# Patient Record
Sex: FEMALE | Race: WHITE | NOT HISPANIC OR LATINO | Employment: FULL TIME | URBAN - METROPOLITAN AREA
[De-identification: names, ages, dates, MRNs, and addresses within clinical notes are randomized per-mention and may not be internally consistent; named-entity substitution may affect disease eponyms.]

---

## 2017-04-18 ENCOUNTER — OFFICE VISIT (OUTPATIENT)
Dept: FAMILY MEDICINE CLINIC | Facility: CLINIC | Age: 57
End: 2017-04-18

## 2017-04-18 VITALS
WEIGHT: 151.2 LBS | HEIGHT: 65 IN | BODY MASS INDEX: 25.19 KG/M2 | DIASTOLIC BLOOD PRESSURE: 64 MMHG | HEART RATE: 77 BPM | TEMPERATURE: 98.7 F | SYSTOLIC BLOOD PRESSURE: 106 MMHG | OXYGEN SATURATION: 97 %

## 2017-04-18 DIAGNOSIS — Z00.00 ANNUAL PHYSICAL EXAM: Primary | ICD-10-CM

## 2017-04-18 DIAGNOSIS — Z63.5 MARITAL DISRUPTION INVOLVING DIVORCE: ICD-10-CM

## 2017-04-18 DIAGNOSIS — Z12.11 COLON CANCER SCREENING: ICD-10-CM

## 2017-04-18 PROBLEM — M77.10 BACKHAND TENNIS ELBOW: Status: ACTIVE | Noted: 2017-04-18

## 2017-04-18 PROBLEM — M77.00 EPICONDYLITIS ELBOW, MEDIAL: Status: ACTIVE | Noted: 2017-04-18

## 2017-04-18 PROBLEM — M54.12 CERVICAL NERVE ROOT DISORDER: Status: ACTIVE | Noted: 2017-04-18

## 2017-04-18 PROBLEM — F41.9 ANXIETY: Status: ACTIVE | Noted: 2017-04-18

## 2017-04-18 PROBLEM — G56.00 CARPAL TUNNEL SYNDROME: Status: ACTIVE | Noted: 2017-04-18

## 2017-04-18 PROCEDURE — 99396 PREV VISIT EST AGE 40-64: CPT | Performed by: FAMILY MEDICINE

## 2017-04-18 RX ORDER — IBUPROFEN 200 MG
TABLET ORAL
COMMUNITY
Start: 2014-03-20

## 2017-04-18 RX ORDER — ALPRAZOLAM 0.5 MG/1
TABLET ORAL
COMMUNITY
Start: 2013-06-20

## 2017-04-18 SDOH — SOCIAL STABILITY - SOCIAL INSECURITY: DISRUPTION OF FAMILY BY SEPARATION AND DIVORCE: Z63.5

## 2017-04-18 NOTE — PROGRESS NOTES
"Subjective   Rosa Sandoval is a 56 y.o. female.     History of Present Illness Here for a complete physical.  Going through divorce-  Undergoing mediation and court next May.   Pt wanted him t go , but it is still hard for her. He was a player. \"Shaneka had enough\".  No children with him.Pt has 2 children, 23 and 21.they live with their father.  [employer], .    Thinks she is getting depressed but does not want any antidepressants at this point. I s willing to consider therapy.Use   The following portions of the patient's history were reviewed and updated as appropriate: allergies, current medications, past social history and problem list.    Review of Systems   Constitutional: Negative for appetite change, fever and unexpected weight change.   HENT: Negative for ear pain, facial swelling and sore throat.    Eyes: Negative for pain and visual disturbance.   Respiratory: Negative for chest tightness, shortness of breath and wheezing.    Cardiovascular: Negative for chest pain and palpitations.   Gastrointestinal: Negative for abdominal pain and blood in stool.   Endocrine: Negative.    Genitourinary: Negative for difficulty urinating and hematuria.   Musculoskeletal: Negative for joint swelling.   Neurological: Negative for tremors, seizures and syncope.   Hematological: Negative for adenopathy.   Psychiatric/Behavioral: Positive for dysphoric mood. The patient is nervous/anxious.        Objective   /64 (BP Location: Left arm, Patient Position: Sitting, Cuff Size: Adult)  Pulse 77  Temp 98.7 °F (37.1 °C) (Oral)   Ht 65\" (165.1 cm)  Wt 151 lb 3.2 oz (68.6 kg)  SpO2 97%  BMI 25.16 kg/m2  Physical Exam   Constitutional: She is oriented to person, place, and time. She appears well-developed and well-nourished. No distress.   HENT:   Head: Normocephalic and atraumatic. Hair is normal.   Right Ear: Hearing, tympanic membrane, external ear and ear canal normal. No drainage. No decreased hearing " is noted.   Left Ear: Hearing, tympanic membrane, external ear and ear canal normal. No decreased hearing is noted.   Nose: No nasal deformity.   Mouth/Throat: Oropharynx is clear and moist.   Eyes: Conjunctivae, EOM and lids are normal. Pupils are equal, round, and reactive to light. Lids are everted and swept, no foreign bodies found. Right eye exhibits no discharge. Left eye exhibits no discharge.   Fundoscopic exam:       The right eye shows red reflex.        The left eye shows red reflex.   Neck: Normal range of motion. Neck supple. No JVD present. No tracheal deviation present. No thyromegaly present.   Cardiovascular: Normal rate, regular rhythm, normal heart sounds, intact distal pulses and normal pulses.  Exam reveals no gallop and no friction rub.    No murmur heard.  Pulmonary/Chest: Effort normal and breath sounds normal. No respiratory distress. She has no wheezes. She has no rales. She exhibits no tenderness. Right breast exhibits no mass and no tenderness. Left breast exhibits no mass and no tenderness. Breasts are symmetrical.   Abdominal: Soft. Bowel sounds are normal. She exhibits no distension and no mass. There is no tenderness. There is no rebound and no guarding. No hernia.   Musculoskeletal: Normal range of motion. She exhibits no edema, tenderness or deformity.   Lymphadenopathy:     She has no cervical adenopathy.        Right: No inguinal adenopathy present.        Left: No inguinal adenopathy present.   Neurological: She is alert and oriented to person, place, and time. She has normal reflexes. She displays normal reflexes. No cranial nerve deficit. She exhibits normal muscle tone. Coordination normal.   Skin: Skin is warm and dry. No rash noted. She is not diaphoretic. No erythema.   Psychiatric: She has a normal mood and affect. Her behavior is normal. Judgment and thought content normal.   Nursing note and vitals reviewed.      Assessment/Plan   Problem List Items Addressed This Visit      None      Visit Diagnoses     Annual physical exam    -  Primary    Relevant Orders    CBC & Differential (Completed)    Comprehensive Metabolic Panel (Completed)    Lipid Panel With / Chol / HDL Ratio (Completed)    TSH (Completed)    Vitamin D 25 Hydroxy (Completed)    Colon cancer screening        Relevant Orders    Ambulatory Referral For Screening Colonoscopy    Marital disruption involving divorce               Use insurance to locate therapist.   Can call for antidpressant if nec, and I would of course see her again at that point.

## 2017-04-19 LAB
25(OH)D3+25(OH)D2 SERPL-MCNC: 42 NG/ML (ref 30–100)
ALBUMIN SERPL-MCNC: 4.3 G/DL (ref 3.5–5.2)
ALBUMIN/GLOB SERPL: 1.5 G/DL
ALP SERPL-CCNC: 57 U/L (ref 39–117)
ALT SERPL-CCNC: 18 U/L (ref 1–33)
AST SERPL-CCNC: 21 U/L (ref 1–32)
BASOPHILS # BLD AUTO: 0.08 10*3/MM3 (ref 0–0.2)
BASOPHILS NFR BLD AUTO: 1.2 % (ref 0–1.5)
BILIRUB SERPL-MCNC: 0.3 MG/DL (ref 0.1–1.2)
BUN SERPL-MCNC: 12 MG/DL (ref 6–20)
BUN/CREAT SERPL: 13.2 (ref 7–25)
CALCIUM SERPL-MCNC: 10.2 MG/DL (ref 8.6–10.5)
CHLORIDE SERPL-SCNC: 103 MMOL/L (ref 98–107)
CHOLEST SERPL-MCNC: 215 MG/DL (ref 0–200)
CHOLEST/HDLC SERPL: 3.64 {RATIO}
CO2 SERPL-SCNC: 27 MMOL/L (ref 22–29)
CREAT SERPL-MCNC: 0.91 MG/DL (ref 0.57–1)
EOSINOPHIL # BLD AUTO: 0.06 10*3/MM3 (ref 0–0.7)
EOSINOPHIL NFR BLD AUTO: 0.9 % (ref 0.3–6.2)
ERYTHROCYTE [DISTWIDTH] IN BLOOD BY AUTOMATED COUNT: 13.3 % (ref 11.7–13)
GLOBULIN SER CALC-MCNC: 2.9 GM/DL
GLUCOSE SERPL-MCNC: 87 MG/DL (ref 65–99)
HCT VFR BLD AUTO: 39.8 % (ref 35.6–45.5)
HDLC SERPL-MCNC: 59 MG/DL (ref 40–60)
HGB BLD-MCNC: 13.2 G/DL (ref 11.9–15.5)
IMM GRANULOCYTES # BLD: 0 10*3/MM3 (ref 0–0.03)
IMM GRANULOCYTES NFR BLD: 0 % (ref 0–0.5)
LDLC SERPL CALC-MCNC: 135 MG/DL (ref 0–100)
LYMPHOCYTES # BLD AUTO: 2.02 10*3/MM3 (ref 0.9–4.8)
LYMPHOCYTES NFR BLD AUTO: 29.3 % (ref 19.6–45.3)
MCH RBC QN AUTO: 30.9 PG (ref 26.9–32)
MCHC RBC AUTO-ENTMCNC: 33.2 G/DL (ref 32.4–36.3)
MCV RBC AUTO: 93.2 FL (ref 80.5–98.2)
MONOCYTES # BLD AUTO: 0.55 10*3/MM3 (ref 0.2–1.2)
MONOCYTES NFR BLD AUTO: 8 % (ref 5–12)
NEUTROPHILS # BLD AUTO: 4.18 10*3/MM3 (ref 1.9–8.1)
NEUTROPHILS NFR BLD AUTO: 60.6 % (ref 42.7–76)
PLATELET # BLD AUTO: 293 10*3/MM3 (ref 140–500)
POTASSIUM SERPL-SCNC: 4.8 MMOL/L (ref 3.5–5.2)
PROT SERPL-MCNC: 7.2 G/DL (ref 6–8.5)
RBC # BLD AUTO: 4.27 10*6/MM3 (ref 3.9–5.2)
SODIUM SERPL-SCNC: 143 MMOL/L (ref 136–145)
TRIGL SERPL-MCNC: 104 MG/DL (ref 0–150)
TSH SERPL DL<=0.005 MIU/L-ACNC: 1.71 MIU/ML (ref 0.27–4.2)
VLDLC SERPL CALC-MCNC: 20.8 MG/DL (ref 5–40)
WBC # BLD AUTO: 6.89 10*3/MM3 (ref 4.5–10.7)

## 2019-03-20 ENCOUNTER — APPOINTMENT (OUTPATIENT)
Dept: WOMENS IMAGING | Facility: HOSPITAL | Age: 59
End: 2019-03-20

## 2019-03-20 PROCEDURE — 77067 SCR MAMMO BI INCL CAD: CPT | Performed by: RADIOLOGY

## 2019-03-20 PROCEDURE — 77063 BREAST TOMOSYNTHESIS BI: CPT | Performed by: RADIOLOGY

## 2022-03-02 ENCOUNTER — TREATMENT (OUTPATIENT)
Dept: PHYSICAL THERAPY | Facility: CLINIC | Age: 62
End: 2022-03-02

## 2022-03-02 DIAGNOSIS — M54.2 CERVICALGIA: Primary | ICD-10-CM

## 2022-03-02 PROCEDURE — 97530 THERAPEUTIC ACTIVITIES: CPT | Performed by: PHYSICAL THERAPIST

## 2022-03-02 PROCEDURE — 97140 MANUAL THERAPY 1/> REGIONS: CPT | Performed by: PHYSICAL THERAPIST

## 2022-03-02 PROCEDURE — 97162 PT EVAL MOD COMPLEX 30 MIN: CPT | Performed by: PHYSICAL THERAPIST

## 2022-03-02 PROCEDURE — 97012 MECHANICAL TRACTION THERAPY: CPT | Performed by: PHYSICAL THERAPIST

## 2022-03-02 PROCEDURE — 97014 ELECTRIC STIMULATION THERAPY: CPT | Performed by: PHYSICAL THERAPIST

## 2022-03-02 NOTE — PROGRESS NOTES
Physical Therapy Initial Evaluation and Plan of Care    Patient Name: Rosa Baltazar          :  1960  Referring Physician: Judy Alexis APRN  Diagnosis: Cervicalgia [M54.2]    Date of Evaluation: 3/2/2022  ______________________________________________________________________    Subjective Evaluation    History of Present Illness  Onset date: .  Mechanism of injury: Woke up in am severe pain -   Moved boxes the day before-  2017 similar symptoms on (L) side - from holding phone -     Been seeing Chiro, massages since  3x/wk, mm relaxers  Some better      Patient Occupation:  - computer and phone Pain  Current pain ratin  At worst pain ratin  Location: (L) neck, UT, Scap and down LUE (medially into dorsally into dorsal hand and LLF) - Numbness only in LLF - pain in UE and neck ; Denies weakness in LUE -   Quality: throbbing -   Alleviating factors: Left arm OH -   Exacerbated by: C-rotations; Looking down for prolonged periods - Lifting / carrying; Sitting w/ arm on arm rest -   Symptom course: Improving overall, but recent flare -     Hand dominance: right    Diagnostic Tests  Abnormal x-ray: at Chri[facility]ractor - C5-6?    Treatments  Current treatment: chiropractic and massage  Patient Goals  Patient/family treatment goals: Pain alleviation; Mobility, strength to allow ADLs, job and hobby activities -          ___________________________________________________  Objective          Postural Observations    Additional Postural Observation Details  Decreased C-lordosis; Resting tension (B) UTs  Hypertrophy supraclavicular region (L)  Fwd head / rounded shoulder posture -     Tenderness     Additional Tenderness Details  Tender (L) 1st rib, scalenes, UT >>(R)  Tender C5-7 (R) - Non-tender central C-spine and upper Tspine -     Active Range of Motion     Additional Active Range of Motion Details  C-spine:  Flexion WNL;  Ext 50-deg w/ increased pain in (L) neck  "/ UT and down LUE -   SB: 30-deg (B) w/o pain or radicular symptoms  Rot (R) 70-deg  w/o pain or radicular symptoms -   ROT (L) 50-deg w/ pain (L) lower neck / UT region -     Shoulder / UE WNL -     Strength/Myotome Testing     Additional Strength Details  UE myotomes grossly intact w/ the exception of pain and \"weakness\" w/ resisted finger 2-3 ABDuction - Mild weakness of elbow flexion > shoulder FF w/ pain in  Dorsal forearm to dorsal wrist / hand (L) -     Tests     Additional Tests Details  (-) C-compression; (+) C-Distraction w/ alleviation of LUE symptoms and neck symptoms;   Pain w/ Spurling's w/ OP (L) -  (+) Elevated 1st rib (L)         See Treatment Flow sheet for Exercises, Manual therapy, and modalities.-Set up HEP   FUNCTIONAL ACTIVITIES:   · TAPING / BRACING: NA  · Anatomy / Dysfunction education -   · Jt protection, ADL modification; Posture and     ___________________________________________________  Assessment & Plan     Assessment    Assessment details: 62 yo : Cervicalgia w/ cervical radiculopathy (L) C7 dermatome?- Possible disc involvement -   Pt noted decreased / alleviated LUE and LLF numbness w/ C-traction, etc.     PROBLEMS: Pain; Limited mobility; Intolerance to ADLs, normal job, etc.   PROGNOSIS: Fair/ Good -    GOALS:   SHORT TERM GOALS: 2 weeks:  1) HEP Initiated; 2) Pain decreased 50%:   3) AROM C-spine increased 10-deg as appropriate:  4) Improved postural positioning w/ cuing; 5) Improved functional ability overall -      LONG TERM GOALS: 4 weeks (or at time of DISCHARGE): 1) (I) HEP; 2) AROM WFL and pain free; 3) Strength / mobility to be able to perform all ADL's and job-related activities w/o restrictions; 4) Pt demonstrates improved postural awareness w/ minimal cuing;       Plan  Planned therapy interventions: flexibility, home exercise program, manual therapy, neuromuscular re-education, postural training, soft tissue mobilization, spinal/joint mobilization, " strengthening, stretching and therapeutic activities (Modalities prn; C-traction prn; )  Frequency: 2x week  Duration in weeks: 4  Treatment plan discussed with: patient      ___________________________________________________  Manual Therapy:    15     mins  79524;  Therapeutic Exercise:    05     mins  86372;     Neuromuscular Jefry:        mins  17915;    Therapeutic Activity:     10    mins  56945;   Self Care:                           mins  06040  Ultrasound:     06     mins  88761;  Iontophoresis:          mins  99764;    Electrical Stimulation:    15     mins  08441 ( );  Mechanical Traction:     15     mins  06814  Dry Needling          mins self-pay    Eval:   20   mins    Timed Treatment:   36   mins                  Total Treatment:     95   mins    PT SIGNATURE:   Scooby Suh, PT  DATE TREATMENT INITIATED: 3/2/2022  ___________________________________________________  Initial Certification  Certification Period: 5/31/2022  I certify that the therapy services are furnished while this patient is under my care.  The services outlined above are required by this patient, and will be reviewed every 90 days.     PHYSICIAN: ________________________________  DATE: ______  Judy Alexis APRN        Please sign and return via fax to [phone].. Thank you, [facility].  ______________________________________________________________________  [address] [place] [address]  Phone: [phone] Fax: [phone]

## 2022-03-08 ENCOUNTER — TREATMENT (OUTPATIENT)
Dept: PHYSICAL THERAPY | Facility: CLINIC | Age: 62
End: 2022-03-08

## 2022-03-08 DIAGNOSIS — M54.2 CERVICALGIA: Primary | ICD-10-CM

## 2022-03-08 PROCEDURE — 97140 MANUAL THERAPY 1/> REGIONS: CPT | Performed by: PHYSICAL THERAPIST

## 2022-03-08 PROCEDURE — 97014 ELECTRIC STIMULATION THERAPY: CPT | Performed by: PHYSICAL THERAPIST

## 2022-03-08 PROCEDURE — 97012 MECHANICAL TRACTION THERAPY: CPT | Performed by: PHYSICAL THERAPIST

## 2022-03-08 PROCEDURE — 97110 THERAPEUTIC EXERCISES: CPT | Performed by: PHYSICAL THERAPIST

## 2022-03-08 NOTE — PROGRESS NOTES
Physical Therapy Daily Progress Note  RE-ASSESSMENT     Patient Name: Rosa Baltazar         :  1960  Referring Physician: Judy Alexis APRN      Subjective   Rosa Baltazar reports: much less LUE pain/numbness - now mostly notes numbness only in LLE and pain at inferior aspect of her (L) scap w/ soreness in (L) side of neck and UT - Improved mobilitiy, but limited w/ (L) rotation of neck -     Objective   Tender (L) Cspine/UT - First rib - (L) - (+) elevated 1st rib (L)  Limited c-rotation to (L)  - improved after 1st rib mobilization -     See Exercise, Manual, and Modality Logs for complete treatment.     Functional / Therapeutic Activities:    · TAPING / BRACING: NA  · Jt protection, ADL modification; Posture and      Assessment/Plan  60 yo : Cervicalgia w/ cervical radiculopathy (L) C7 dermatome?- Possible disc involvement -   Pt noted decreased / alleviated LUE and LLF numbness w/ C-traction, etc.     Progress per Plan of Care       _________________________________________________    Manual Therapy:            15     mins  38429;  Therapeutic Exercise:    20    mins  88291;     Neuromuscular Jefry:        mins  62929;    Therapeutic Activity:           mins  52911;     Ultrasound:                    06     mins  71089;  Iontophoresis:                     mins  85948;    Electrical Stimulation:     15    mins  61449 ( );  Mechanical Traction:      15    mins  45088  Dry Needling                       mins self-pay     Timed Treatment:   41    mins                  Total Treatment:     75    mins        Scooby Suh PT  Physical Therapist

## 2022-03-10 ENCOUNTER — TREATMENT (OUTPATIENT)
Dept: PHYSICAL THERAPY | Facility: CLINIC | Age: 62
End: 2022-03-10

## 2022-03-10 DIAGNOSIS — M54.2 CERVICALGIA: Primary | ICD-10-CM

## 2022-03-10 PROCEDURE — 97110 THERAPEUTIC EXERCISES: CPT | Performed by: PHYSICAL THERAPIST

## 2022-03-10 PROCEDURE — 97012 MECHANICAL TRACTION THERAPY: CPT | Performed by: PHYSICAL THERAPIST

## 2022-03-10 NOTE — PROGRESS NOTES
Physical Therapy Daily Progress Note    Patient Name: Rosa Baltazar         :  1960  Referring Physician: Judy Alexis APRN    Pt about 10 min late - work -   Subjective   Rosa Baltazar reports: continued improvement -no UE symptoms other than numbness in LLF, but improving and now only proximal/volar P1 -    Objective   Improved postural awareness -     See Exercise, Manual, and Modality Logs for complete treatment.     Functional / Therapeutic Activities:    · TAPING / BRACING: NA  · Jt protection, ADL modification; Posture and       Assessment/Plan  60 yo : Cervicalgia w/ cervical radiculopathy (L) C7 dermatome?- Possible disc involvement -   Pt noted decreased / alleviated LUE and LLF numbness w/ C-traction, etc.      Progress per Plan of Care     _________________________________________________     Manual Therapy:                 mins  79151;  Therapeutic Exercise:    20    mins  76750;     Neuromuscular Jefry:        mins  01417;    Therapeutic Activity:           mins  53893;     Ultrasound:                         mins  52009;  Iontophoresis:                     mins  88907;    Electrical Stimulation:         mins  35322 ( );  Mechanical Traction:      15    mins  93072  Dry Needling                       mins self-pay     Timed Treatment:   20    mins                  Total Treatment:     40    mins      Scooby Suh PT  Physical Therapist

## 2022-03-15 ENCOUNTER — TREATMENT (OUTPATIENT)
Dept: PHYSICAL THERAPY | Facility: CLINIC | Age: 62
End: 2022-03-15

## 2022-03-15 DIAGNOSIS — M54.12 RADICULOPATHY, CERVICAL: Primary | ICD-10-CM

## 2022-03-15 DIAGNOSIS — M54.2 CERVICALGIA: ICD-10-CM

## 2022-03-15 DIAGNOSIS — G56.02 CARPAL TUNNEL SYNDROME OF LEFT WRIST: ICD-10-CM

## 2022-03-15 PROCEDURE — 97530 THERAPEUTIC ACTIVITIES: CPT | Performed by: PHYSICAL THERAPIST

## 2022-03-15 PROCEDURE — 97140 MANUAL THERAPY 1/> REGIONS: CPT | Performed by: PHYSICAL THERAPIST

## 2022-03-15 PROCEDURE — 97014 ELECTRIC STIMULATION THERAPY: CPT | Performed by: PHYSICAL THERAPIST

## 2022-03-15 PROCEDURE — 97012 MECHANICAL TRACTION THERAPY: CPT | Performed by: PHYSICAL THERAPIST

## 2022-03-15 PROCEDURE — 97110 THERAPEUTIC EXERCISES: CPT | Performed by: PHYSICAL THERAPIST

## 2022-03-15 NOTE — PROGRESS NOTES
Physical Therapy Daily Progress Note    Patient Name: Rosa Baltazar         :  1960  Referring Physician: Judy Alexis APRN      Subjective   Rosa Baltazar reports: Much improved neck/UE pain/NT-ing - Persistent LLE numbness and pain at base of finger - more numb at night at times     Objective   Tender at 1st pulley LLF w/ palpable catches -     See Exercise, Manual, and Modality Logs for complete treatment.     Functional / Therapeutic Activities:    · TAPING / BRACING: NA  · Assessment of LUE (Nerve tension, CT / MN assessment)  · Assessment for trigger finger LUE -   · Jt protection, ADL modification; Posture and       Assessment/Plan  62 yo : Cervicalgia w/ cervical radiculopathy (L) C7 dermatome?- Possible disc involvement -   Pt noted decreased / alleviated LUE and LLF numbness w/ C-traction, etc.   LLF NT-ing probably due to distal nerve compression / Carpal tunnel? And pain at base of LLF may be early trigger finger at A1 pulley -   Manual nerve gliding greatly reduced numbness in LLF -      Progress per Plan of Care     _________________________________________________     Manual Therapy:           08      mins  23009;  Therapeutic Exercise:    20    mins  76985;     Neuromuscular Jefry:        mins  08887;    Therapeutic Activity:       08    mins  46461;     Ultrasound:                         mins  51857;  Iontophoresis:                     mins  49037;    Electrical Stimulation:     15    mins  69894 ( );  Mechanical Traction:      15    mins  34113  Dry Needling                       mins self-pay     Timed Treatment:  36    mins                  Total Treatment:     70    mins        Scooby Suh PT  Physical Therapist

## 2022-03-22 ENCOUNTER — TREATMENT (OUTPATIENT)
Dept: PHYSICAL THERAPY | Facility: CLINIC | Age: 62
End: 2022-03-22

## 2022-03-22 DIAGNOSIS — M54.12 RADICULOPATHY, CERVICAL: ICD-10-CM

## 2022-03-22 DIAGNOSIS — M54.2 CERVICALGIA: Primary | ICD-10-CM

## 2022-03-22 DIAGNOSIS — G56.02 CARPAL TUNNEL SYNDROME OF LEFT WRIST: ICD-10-CM

## 2022-03-22 PROCEDURE — 97110 THERAPEUTIC EXERCISES: CPT | Performed by: PHYSICAL THERAPIST

## 2022-03-22 PROCEDURE — 97012 MECHANICAL TRACTION THERAPY: CPT | Performed by: PHYSICAL THERAPIST

## 2022-03-22 NOTE — PROGRESS NOTES
Physical Therapy Daily Progress Note    Patient Name: Rosa Baltazar         :  1960  Referring Physician: Judy Alexis APRN      Subjective   Rosa Baltazar reports: continued improvement - wishes to progress to a gym - only notes LF numbness, but less - nerve gliding helpful -     Objective   Pt demonstrating improved posture awareness - MN NT LUE - improved overall - (+) Tinels at CT (L) wrist     See Exercise, Manual, and Modality Logs for complete treatment.     Functional / Therapeutic Activities:    · TAPING / BRACING: NA  · Review Jt protection, ADL modification; Posture and Ergonomics     Assessment/Plan  60 yo : Cervicalgia w/ cervical radiculopathy (L) C7 dermatome?- Possible disc involvement -   Pt noted decreased / alleviated LUE and LLF numbness w/ C-traction, etc.   LLF NT-ing probably due to distal nerve compression / Carpal tunnel? And pain at base of LLF may be early trigger finger at A1 pulley -   Manual nerve gliding greatly reduced numbness in LLF -   Most/all goals met -   Would do well with HEP and discontinue PT starting to go to gym -     Trial DC PT to HEP - will all if symptoms increase -      _________________________________________________     Manual Therapy:                mins  15719;  Therapeutic Exercise:    23    mins  65834;     Neuromuscular Jefry:        mins  65437;    Therapeutic Activity:          mins  83028;     Ultrasound:                         mins  50929;  Iontophoresis:                     mins  36557;    Electrical Stimulation:         mins  12331 ( );  Mechanical Traction:      15    mins  15617  Dry Needling                       mins self-pay     Timed Treatment:  23    mins                  Total Treatment:     45    mins      Scooby Suh, PT  Physical Therapist